# Patient Record
Sex: MALE | Race: WHITE | NOT HISPANIC OR LATINO | URBAN - NONMETROPOLITAN AREA
[De-identification: names, ages, dates, MRNs, and addresses within clinical notes are randomized per-mention and may not be internally consistent; named-entity substitution may affect disease eponyms.]

---

## 2019-05-23 ENCOUNTER — IMPORTED ENCOUNTER (OUTPATIENT)
Dept: URBAN - NONMETROPOLITAN AREA CLINIC 1 | Facility: CLINIC | Age: 54
End: 2019-05-23

## 2019-05-23 PROBLEM — H52.03: Noted: 2019-05-23

## 2019-05-23 PROCEDURE — 92015 DETERMINE REFRACTIVE STATE: CPT

## 2019-05-23 PROCEDURE — 92004 COMPRE OPH EXAM NEW PT 1/>: CPT

## 2021-08-18 NOTE — PATIENT DISCUSSION
8/18/2021:  CL trials given today move to daily with more chance of success with comfort - pt to call if prefers either DT1 or [facility] one day.

## 2022-04-09 ASSESSMENT — TONOMETRY
OD_IOP_MMHG: 16
OS_IOP_MMHG: 16

## 2022-04-09 ASSESSMENT — VISUAL ACUITY
OS_SC: J2
OD_CC: 20/20
OS_CC: 20/20
OD_SC: J2